# Patient Record
Sex: MALE | NOT HISPANIC OR LATINO | Employment: FULL TIME | URBAN - METROPOLITAN AREA
[De-identification: names, ages, dates, MRNs, and addresses within clinical notes are randomized per-mention and may not be internally consistent; named-entity substitution may affect disease eponyms.]

---

## 2021-02-19 ENCOUNTER — HOSPITAL ENCOUNTER (OUTPATIENT)
Dept: VACCINE CLINIC | Facility: HOSPITAL | Age: 61
Discharge: HOME | End: 2021-02-19
Attending: INTERNAL MEDICINE

## 2021-03-19 ENCOUNTER — HOSPITAL ENCOUNTER (OUTPATIENT)
Dept: VACCINE CLINIC | Facility: HOSPITAL | Age: 61
Discharge: HOME | End: 2021-03-19
Attending: INTERNAL MEDICINE

## 2021-04-16 ENCOUNTER — HOSPITAL ENCOUNTER (OUTPATIENT)
Dept: GASTROENTEROLOGY | Facility: HOSPITAL | Age: 61
Setting detail: HOSPITAL OUTPATIENT SURGERY
Discharge: HOME | End: 2021-04-16
Attending: INTERNAL MEDICINE

## 2022-07-25 ENCOUNTER — APPOINTMENT (OUTPATIENT)
Dept: GENERAL RADIOLOGY | Facility: HOSPITAL | Age: 62
End: 2022-07-25

## 2022-07-25 ENCOUNTER — HOSPITAL ENCOUNTER (EMERGENCY)
Facility: HOSPITAL | Age: 62
Discharge: HOME | End: 2022-07-25
Attending: EMERGENCY MEDICINE | Admitting: EMERGENCY MEDICINE

## 2022-07-25 ENCOUNTER — APPOINTMENT (OUTPATIENT)
Dept: CT IMAGING | Facility: HOSPITAL | Age: 62
End: 2022-07-25

## 2022-07-25 VITALS
HEART RATE: 60 BPM | HEIGHT: 69 IN | DIASTOLIC BLOOD PRESSURE: 96 MMHG | TEMPERATURE: 97.9 F | OXYGEN SATURATION: 95 % | WEIGHT: 252.43 LBS | RESPIRATION RATE: 22 BRPM | SYSTOLIC BLOOD PRESSURE: 154 MMHG | BODY MASS INDEX: 37.39 KG/M2

## 2022-07-25 DIAGNOSIS — R55 VASOVAGAL NEAR-SYNCOPE: ICD-10-CM

## 2022-07-25 DIAGNOSIS — R19.7 NAUSEA, VOMITING, AND DIARRHEA: ICD-10-CM

## 2022-07-25 DIAGNOSIS — N28.1 RENAL CYST, ACQUIRED, LEFT: ICD-10-CM

## 2022-07-25 DIAGNOSIS — K52.9 ACUTE COLITIS: Primary | ICD-10-CM

## 2022-07-25 DIAGNOSIS — R91.1 RIGHT LOWER LOBE PULMONARY NODULE: ICD-10-CM

## 2022-07-25 DIAGNOSIS — A05.9 FOOD POISONING: ICD-10-CM

## 2022-07-25 DIAGNOSIS — R11.2 NAUSEA, VOMITING, AND DIARRHEA: ICD-10-CM

## 2022-07-25 LAB
ALBUMIN SERPL-MCNC: 4.4 G/DL (ref 3.5–5.2)
ALBUMIN/GLOB SERPL: 1.6 G/DL
ALP SERPL-CCNC: 74 U/L (ref 39–117)
ALT SERPL W P-5'-P-CCNC: 13 U/L (ref 1–41)
ANION GAP SERPL CALCULATED.3IONS-SCNC: 12.9 MMOL/L (ref 5–15)
AST SERPL-CCNC: 22 U/L (ref 1–40)
BASOPHILS # BLD AUTO: 0.07 10*3/MM3 (ref 0–0.2)
BASOPHILS NFR BLD AUTO: 0.5 % (ref 0–1.5)
BILIRUB SERPL-MCNC: 0.6 MG/DL (ref 0–1.2)
BUN SERPL-MCNC: 14 MG/DL (ref 8–23)
BUN/CREAT SERPL: 17.1 (ref 7–25)
CALCIUM SPEC-SCNC: 10.1 MG/DL (ref 8.6–10.5)
CHLORIDE SERPL-SCNC: 100 MMOL/L (ref 98–107)
CO2 SERPL-SCNC: 22.1 MMOL/L (ref 22–29)
CREAT SERPL-MCNC: 0.82 MG/DL (ref 0.76–1.27)
DEPRECATED RDW RBC AUTO: 39.5 FL (ref 37–54)
EGFRCR SERPLBLD CKD-EPI 2021: 99.9 ML/MIN/1.73
EOSINOPHIL # BLD AUTO: 0.07 10*3/MM3 (ref 0–0.4)
EOSINOPHIL NFR BLD AUTO: 0.5 % (ref 0.3–6.2)
ERYTHROCYTE [DISTWIDTH] IN BLOOD BY AUTOMATED COUNT: 12.9 % (ref 12.3–15.4)
GLOBULIN UR ELPH-MCNC: 2.8 GM/DL
GLUCOSE SERPL-MCNC: 177 MG/DL (ref 65–99)
HCT VFR BLD AUTO: 46.1 % (ref 37.5–51)
HGB BLD-MCNC: 15.6 G/DL (ref 13–17.7)
HOLD SPECIMEN: NORMAL
HOLD SPECIMEN: NORMAL
IMM GRANULOCYTES # BLD AUTO: 0.11 10*3/MM3 (ref 0–0.05)
IMM GRANULOCYTES NFR BLD AUTO: 0.7 % (ref 0–0.5)
LYMPHOCYTES # BLD AUTO: 1.83 10*3/MM3 (ref 0.7–3.1)
LYMPHOCYTES NFR BLD AUTO: 12 % (ref 19.6–45.3)
MAGNESIUM SERPL-MCNC: 2.1 MG/DL (ref 1.6–2.4)
MCH RBC QN AUTO: 28.4 PG (ref 26.6–33)
MCHC RBC AUTO-ENTMCNC: 33.8 G/DL (ref 31.5–35.7)
MCV RBC AUTO: 83.8 FL (ref 79–97)
MONOCYTES # BLD AUTO: 0.89 10*3/MM3 (ref 0.1–0.9)
MONOCYTES NFR BLD AUTO: 5.8 % (ref 5–12)
NEUTROPHILS NFR BLD AUTO: 12.26 10*3/MM3 (ref 1.7–7)
NEUTROPHILS NFR BLD AUTO: 80.5 % (ref 42.7–76)
NRBC BLD AUTO-RTO: 0 /100 WBC (ref 0–0.2)
PLATELET # BLD AUTO: 293 10*3/MM3 (ref 140–450)
PMV BLD AUTO: 8.9 FL (ref 6–12)
POTASSIUM SERPL-SCNC: 4 MMOL/L (ref 3.5–5.2)
PROT SERPL-MCNC: 7.2 G/DL (ref 6–8.5)
QT INTERVAL: 403 MS
RBC # BLD AUTO: 5.5 10*6/MM3 (ref 4.14–5.8)
SODIUM SERPL-SCNC: 135 MMOL/L (ref 136–145)
TROPONIN T SERPL-MCNC: <0.01 NG/ML (ref 0–0.03)
WBC NRBC COR # BLD: 15.23 10*3/MM3 (ref 3.4–10.8)
WHOLE BLOOD HOLD COAG: NORMAL
WHOLE BLOOD HOLD SPECIMEN: NORMAL

## 2022-07-25 PROCEDURE — 25010000002 ONDANSETRON PER 1 MG: Performed by: EMERGENCY MEDICINE

## 2022-07-25 PROCEDURE — 93005 ELECTROCARDIOGRAM TRACING: CPT | Performed by: EMERGENCY MEDICINE

## 2022-07-25 PROCEDURE — 0 IOPAMIDOL PER 1 ML: Performed by: EMERGENCY MEDICINE

## 2022-07-25 PROCEDURE — 93005 ELECTROCARDIOGRAM TRACING: CPT

## 2022-07-25 PROCEDURE — 96375 TX/PRO/DX INJ NEW DRUG ADDON: CPT

## 2022-07-25 PROCEDURE — 96374 THER/PROPH/DIAG INJ IV PUSH: CPT

## 2022-07-25 PROCEDURE — 71045 X-RAY EXAM CHEST 1 VIEW: CPT

## 2022-07-25 PROCEDURE — 80053 COMPREHEN METABOLIC PANEL: CPT | Performed by: EMERGENCY MEDICINE

## 2022-07-25 PROCEDURE — 74177 CT ABD & PELVIS W/CONTRAST: CPT

## 2022-07-25 PROCEDURE — 84484 ASSAY OF TROPONIN QUANT: CPT | Performed by: EMERGENCY MEDICINE

## 2022-07-25 PROCEDURE — 93010 ELECTROCARDIOGRAM REPORT: CPT | Performed by: INTERNAL MEDICINE

## 2022-07-25 PROCEDURE — 85025 COMPLETE CBC W/AUTO DIFF WBC: CPT | Performed by: EMERGENCY MEDICINE

## 2022-07-25 PROCEDURE — 99283 EMERGENCY DEPT VISIT LOW MDM: CPT

## 2022-07-25 PROCEDURE — 83735 ASSAY OF MAGNESIUM: CPT | Performed by: EMERGENCY MEDICINE

## 2022-07-25 PROCEDURE — 25010000002 KETOROLAC TROMETHAMINE PER 15 MG: Performed by: EMERGENCY MEDICINE

## 2022-07-25 RX ORDER — HYDROCODONE BITARTRATE AND ACETAMINOPHEN 5; 325 MG/1; MG/1
1 TABLET ORAL EVERY 6 HOURS PRN
Qty: 12 TABLET | Refills: 0 | Status: SHIPPED | OUTPATIENT
Start: 2022-07-25

## 2022-07-25 RX ORDER — METRONIDAZOLE 500 MG/1
500 TABLET ORAL 3 TIMES DAILY
Qty: 30 TABLET | Refills: 0 | Status: SHIPPED | OUTPATIENT
Start: 2022-07-25

## 2022-07-25 RX ORDER — ONDANSETRON 2 MG/ML
4 INJECTION INTRAMUSCULAR; INTRAVENOUS ONCE
Status: COMPLETED | OUTPATIENT
Start: 2022-07-25 | End: 2022-07-25

## 2022-07-25 RX ORDER — KETOROLAC TROMETHAMINE 30 MG/ML
30 INJECTION, SOLUTION INTRAMUSCULAR; INTRAVENOUS ONCE
Status: COMPLETED | OUTPATIENT
Start: 2022-07-25 | End: 2022-07-25

## 2022-07-25 RX ORDER — SODIUM CHLORIDE 0.9 % (FLUSH) 0.9 %
10 SYRINGE (ML) INJECTION AS NEEDED
Status: DISCONTINUED | OUTPATIENT
Start: 2022-07-25 | End: 2022-07-25 | Stop reason: HOSPADM

## 2022-07-25 RX ORDER — CIPROFLOXACIN 500 MG/1
500 TABLET, FILM COATED ORAL EVERY 12 HOURS
Qty: 20 TABLET | Refills: 0 | Status: SHIPPED | OUTPATIENT
Start: 2022-07-25

## 2022-07-25 RX ORDER — ONDANSETRON 4 MG/1
4 TABLET, ORALLY DISINTEGRATING ORAL EVERY 6 HOURS PRN
Qty: 12 TABLET | Refills: 0 | Status: SHIPPED | OUTPATIENT
Start: 2022-07-25

## 2022-07-25 RX ADMIN — IOPAMIDOL 100 ML: 755 INJECTION, SOLUTION INTRAVENOUS at 09:51

## 2022-07-25 RX ADMIN — SODIUM CHLORIDE 1000 ML: 9 INJECTION, SOLUTION INTRAVENOUS at 08:06

## 2022-07-25 RX ADMIN — KETOROLAC TROMETHAMINE 30 MG: 30 INJECTION, SOLUTION INTRAMUSCULAR; INTRAVENOUS at 09:06

## 2022-07-25 RX ADMIN — Medication 10 ML: at 08:07

## 2022-07-25 RX ADMIN — ONDANSETRON 4 MG: 2 INJECTION INTRAMUSCULAR; INTRAVENOUS at 09:06

## 2022-07-25 NOTE — ED TRIAGE NOTES
"Pt ambulatory to ED triage with multiple complaints. Pt reports Saturday night had suspected food poisoning because 3-4h after eating had severe abd cramping and nvd at the same time.   Pt reports going to the bathroom at 2100 and while he was vomiting/diarrhea he fell off the commode onto his R side and was unable to use his L arm for 20 min.  Pt reports still having residual weakness to L arm and profound dizziness \"like i'm going to blackout\" and walking diagonally. Pt reports now has \"splattering of blood\" when he has a bm and feeling sob.  Pt presents a/ox4, gcs 15, reports waited to come to ED for eval \"because I wanted to wait until you were fully staffed\"  Pt drove self here.  "

## 2022-07-25 NOTE — ED PROVIDER NOTES
"Time: 8:03 AM EDT  Arrived by: private car  Chief Complaint: Dizziness, left arm numbness  History provided by: pt  History is limited by: N/A     History of Present Illness:  Patient is a 62 y.o. year old male who presents to the emergency department with  A chief complaint of dizziness, nausea, vomiting. Pt ambulatory to ED triage with multiple complaints. Pt reports Saturday night had suspected food poisoning because 3-4h after eating had severe abd cramping, 5/10 pain, constant, and generalized, and nvd at the same time. Pt reports going to the bathroom at 2100 and while he was vomiting/diarrhea he fell off the commode onto his R side and was unable to use his L arm for 20 min. He could move his right arm but it was really weak. Pt reports still having residual weakness to L arm and profound dizziness \"like i'm going to blackout\" and walking diagonally. Pt reports now has \"splattering of blood\" when he has a bm and feeling sob.Pt presents a/ox4, gcs 15, reports waited to come to ED for eval \"because I wanted to wait until you were fully staffed\". Pt drove self here. Pt denies dysuria and hematuria. Pt denies being on a blood thinner. Pt states the food he had ate was [employer] chicken.                   Similar Symptoms Previously: No  Recently seen: No      Patient Care Team  Primary Care Provider: Provider, No Known    Past Medical History:     No Known Allergies  Past Medical History:   Diagnosis Date   • Allergies    • Hypertension    • Meniere disease      Past Surgical History:   Procedure Laterality Date   • EXTERNAL EAR SURGERY       History reviewed. No pertinent family history.    Home Medications:  Prior to Admission medications    Medication Sig Start Date End Date Taking? Authorizing Provider   azithromycin (Zithromax Z-Earle) 250 MG tablet Take 2 tablets by mouth on day 1, then 1 tablet daily on days 2-5 12/14/21   Kristy Umana APRN   brompheniramine-pseudoephedrine-DM 30-2-10 MG/5ML " "syrup Take 10 mL by mouth 4 (Four) Times a Day As Needed for Congestion or Cough. 12/14/21   Kristy Umana APRN   fexofenadine (ALLEGRA) 180 MG tablet Take 180 mg by mouth Daily.    Emergency, Nurse Aimee, RN        Social History:   Social History     Tobacco Use   • Smoking status: Never Smoker   • Smokeless tobacco: Never Used   Substance Use Topics   • Alcohol use: Not Currently   • Drug use: Never     Recent travel: no     Review of Systems:  Review of Systems   Constitutional: Negative for chills and fever.   HENT: Negative for congestion, ear pain and sore throat.    Eyes: Negative for pain.   Respiratory: Positive for shortness of breath. Negative for cough and chest tightness.    Cardiovascular: Negative for chest pain.   Gastrointestinal: Positive for abdominal pain, nausea and vomiting. Negative for diarrhea.   Genitourinary: Negative for flank pain and hematuria.        Pt reports blood in bowl movements now   Musculoskeletal: Negative for joint swelling.   Skin: Negative for pallor.   Neurological: Positive for dizziness. Negative for seizures and headaches.        Pt reports passing out, left arm and right arm weakness   All other systems reviewed and are negative.       Physical Exam:  /96   Pulse 60   Temp 97.9 °F (36.6 °C) (Oral)   Resp 22   Ht 175.3 cm (69\")   Wt 115 kg (252 lb 6.8 oz)   SpO2 95%   BMI 37.28 kg/m²     Physical Exam  Vitals and nursing note reviewed.   Constitutional:       General: He is not in acute distress.     Appearance: Normal appearance. He is not toxic-appearing.   HENT:      Head: Normocephalic and atraumatic.      Mouth/Throat:      Mouth: Mucous membranes are moist.   Eyes:      General: No scleral icterus.  Cardiovascular:      Rate and Rhythm: Normal rate and regular rhythm.      Pulses: Normal pulses.      Heart sounds: Normal heart sounds.   Pulmonary:      Effort: Pulmonary effort is normal. No respiratory distress.      Breath sounds: Normal " breath sounds.   Abdominal:      General: Abdomen is flat.      Palpations: Abdomen is soft.      Tenderness: There is abdominal tenderness (mild) in the suprapubic area.   Musculoskeletal:         General: Normal range of motion.      Cervical back: Normal range of motion and neck supple.   Skin:     General: Skin is warm and dry.   Neurological:      Mental Status: He is alert and oriented to person, place, and time. Mental status is at baseline.      Cranial Nerves: No dysarthria or facial asymmetry.      Sensory: Sensation is intact.      Motor: No weakness or pronator drift.                Medications in the Emergency Department:  Medications   sodium chloride 0.9 % bolus 1,000 mL (0 mL Intravenous Stopped 7/25/22 0906)   ondansetron (ZOFRAN) injection 4 mg (4 mg Intravenous Given 7/25/22 0906)   ketorolac (TORADOL) injection 30 mg (30 mg Intravenous Given 7/25/22 0906)   iopamidol (ISOVUE-370) 76 % injection 100 mL (100 mL Intravenous Given 7/25/22 0951)        Labs  Lab Results (last 24 hours)     Procedure Component Value Units Date/Time    CBC & Differential [[id number]]  (Abnormal) Collected: 07/25/22 0749    Specimen: Blood Updated: 07/25/22 0802    Narrative:      The following orders were created for panel order CBC & Differential.  Procedure                               Abnormality         Status                     ---------                               -----------         ------                     CBC Auto Differential[[id number]]        Abnormal            Final result                 Please view results for these tests on the individual orders.    Comprehensive Metabolic Panel [[id number]]  (Abnormal) Collected: 07/25/22 0749    Specimen: Blood Updated: 07/25/22 0823     Glucose 177 mg/dL      BUN 14 mg/dL      Creatinine 0.82 mg/dL      Sodium 135 mmol/L      Potassium 4.0 mmol/L      Comment: Slight hemolysis detected by analyzer. Results may be affected.        Chloride 100 mmol/L      CO2 22.1  mmol/L      Calcium 10.1 mg/dL      Total Protein 7.2 g/dL      Albumin 4.40 g/dL      ALT (SGPT) 13 U/L      AST (SGOT) 22 U/L      Alkaline Phosphatase 74 U/L      Total Bilirubin 0.6 mg/dL      Globulin 2.8 gm/dL      A/G Ratio 1.6 g/dL      BUN/Creatinine Ratio 17.1     Anion Gap 12.9 mmol/L      eGFR 99.9 mL/min/1.73      Comment: National Kidney Foundation and American Society of Nephrology (ASN) Task Force recommended calculation based on the Chronic Kidney Disease Epidemiology Collaboration (CKD-EPI) equation refit without adjustment for race.       Narrative:      GFR Normal >60  Chronic Kidney Disease <60  Kidney Failure <15      Troponin [[id number]]  (Normal) Collected: 07/25/22 0749    Specimen: Blood Updated: 07/25/22 0823     Troponin T <0.010 ng/mL     Narrative:      Troponin T Reference Range:  <= 0.03 ng/mL-   Negative for AMI  >0.03 ng/mL-     Abnormal for myocardial necrosis.  Clinicians would have to utilize clinical acumen, EKG, Troponin and serial changes to determine if it is an Acute Myocardial Infarction or myocardial injury due to an underlying chronic condition.       Results may be falsely decreased if patient taking Biotin.      Magnesium [[id number]]  (Normal) Collected: 07/25/22 0749    Specimen: Blood Updated: 07/25/22 0823     Magnesium 2.1 mg/dL     CBC Auto Differential [[id number]]  (Abnormal) Collected: 07/25/22 0749    Specimen: Blood Updated: 07/25/22 0802     WBC 15.23 10*3/mm3      RBC 5.50 10*6/mm3      Hemoglobin 15.6 g/dL      Hematocrit 46.1 %      MCV 83.8 fL      MCH 28.4 pg      MCHC 33.8 g/dL      RDW 12.9 %      RDW-SD 39.5 fl      MPV 8.9 fL      Platelets 293 10*3/mm3      Neutrophil % 80.5 %      Lymphocyte % 12.0 %      Monocyte % 5.8 %      Eosinophil % 0.5 %      Basophil % 0.5 %      Immature Grans % 0.7 %      Neutrophils, Absolute 12.26 10*3/mm3      Lymphocytes, Absolute 1.83 10*3/mm3      Monocytes, Absolute 0.89 10*3/mm3      Eosinophils, Absolute 0.07  10*3/mm3      Basophils, Absolute 0.07 10*3/mm3      Immature Grans, Absolute 0.11 10*3/mm3      nRBC 0.0 /100 WBC     Clostridium Difficile Toxin, PCR - Stool, Per Rectum [[id number]] Updated: 07/25/22 1324    Specimen: Stool from Per Rectum            Imaging:  CT Abdomen Pelvis With Contrast    Result Date: 7/25/2022  PROCEDURE: CT ABDOMEN PELVIS W CONTRAST  COMPARISON: None  INDICATIONS: GENERALIZED LOWER ABDOMEN PAIN WITH BLOODY DIARRHEA  TECHNIQUE: After obtaining the patient's consent, CT images were created with non-ionic intravenous contrast material.   PROTOCOL:   Standard imaging protocol performed    RADIATION:   DLP: 1600mGy*cm   Automated exposure control was utilized to minimize radiation dose. CONTRAST: 100cc Isovue 370 I.V.  FINDINGS:  There are two 4 mm noncalcified fissural nodules in the right base adjacent to the major fissure and minor fissure.  There is linear consolidation in the bases suggesting atelectasis.  The coronary artery calcification is present.  The spleen, pancreas adrenals, liver and gallbladder have a grossly normal appearance.  There is a 1.9 cm mixed cystic and solid lesion with enhancing mural nodularity in the upper pole of the left kidney.  This is suspicious for renal cell carcinoma.  This may be better characterized by MRI.  The kidneys are otherwise grossly unremarkable.  There is no evidence of hydronephrosis.  Urinary bladder is grossly unremarkable.  Prostate demonstrates central calcification.  The stomach and small bowel have a grossly normal caliber and appearance.  The appendix is unremarkable.  Colonic fecal retention is noted.  There is circumferential wall thickening involving the descending colon with pericolonic inflammatory stranding.  This extends from the splenic flexure to the left lower quadrant.  This may be due to inflammatory bowel disease, ischemic or infectious colitis.  Correlate clinically.  There are no colonic diverticula within the area to  suggest diverticulitis.  There is grossly normal opacification of mesenteric vessels.  Scattered atherosclerotic vascular calcification is noted.  No gross adenopathy is identified in the abdomen or pelvis.  There is a small fat containing left inguinal hernia.  There is bony ankylosis of the right SI joint.  No aggressive osseous lesions are identified.         1. There is long segment circumferential wall thickening involving the descending colon and associated pericolonic inflammatory change indicating nonspecific colitis.  This may be due to inflammatory bowel disease, ischemic or infectious colitis.  2. There is a 1.9 cm mixed solid and cystic partially enhancing lesion arising from the upper pole of the left kidney.  This is suspicious for renal cell carcinoma.  This may be more definitively characterized by MRI.  3. 4 mm noncalcified right basilar fissural nodules.  Follow-up chest CT recommended based on patient risk factors for lung cancer per Fleischner guidelines.      LYNDSEY BRAGG MD       Electronically Signed and Approved By: LYNDSEY BRAGG MD on 7/25/2022 at 10:17             XR Chest 1 View    Result Date: 7/25/2022  PROCEDURE: XR CHEST 1 VW  COMPARISON: None  INDICATIONS: Weak/Dizzy/AMS triage protocol  FINDINGS:  There is linear density in the left base suggesting minor atelectasis or scarring.  There are low lung volumes.  There is a 5 mm nodular density projecting over the ribs at the right base.  This may be due to calcified granuloma.  There are no prior studies available for comparison, however.  No other focal pulmonary opacities are identified.  No pleural fluid is seen.  No pneumothorax is evident.  Heart size and mediastinal contour are within normal limits.         1. Left basilar scarring or atelectasis.  2. 5 mm indeterminate nodular density projecting over the right base may be due to calcified granuloma.  No prior studies available for comparison.  This could be more definitively  characterized by CT in nonemergent fashion.        LYNDSEY BRAGG MD       Electronically Signed and Approved By: LYNDSEY BRAGG MD on 7/25/2022 at 7:39               Procedures:  ECG 12 Lead      Date/Time: 7/25/2022 8:24 AM  Performed by: Martínez Lowery MD  Authorized by: Martínez Lowery MD   Rhythm: sinus rhythm  BPM: 68  QRS axis: normal  ST Segments: ST segments normal  T Waves: T waves normal  Other findings comments: Normal P waves  Clinical impression comment: No acute ischemia            Progress                            Medical Decision Making:  MDM  Number of Diagnoses or Management Options     Amount and/or Complexity of Data Reviewed  Clinical lab tests: reviewed  Tests in the radiology section of CPT®: reviewed  Tests in the medicine section of CPT®: reviewed    Patient Progress  Patient progress: (12:07 EDT Updated pt on progress and incidental findings, specifically pulmonary nodules and left kidney, complex cyst. Recommended follow up MRI and follow up appointment. Pt understood and agreed to plan)       My differential diagnosis for this patient's syncope includes orthostatic hypotension, dehydration, acute blood loss anemia, cardiac arrhythmia, vasovagal syncope.    In my differential diagnosis of this patient with abdominal pain, I considered viral gastroenteritis, acute gastritis, GERD exacerbation with esophagitis, peptic ulcer disease, pancreatitis, cholecystitis, appendicitis.        Patient is a 62-year-old male who presents with sudden onset of nausea and vomiting and diarrhea and abdominal cramping after eating the other day.    It sounds like an episode of food poisoning versus less likely viral gastroenteritis.    Given his tenderness and the fact that he did have some bloody diarrhea yesterday, I did get a CT scan which does show some descending colitis.    I am starting him on Cipro and Flagyl antibiotics and clear liquid diet and some pain and nausea meds.    He was on some empiric  antibiotics following skin biopsy on his back a couple weeks ago and I considered possibility of C. difficile and we tried to send a stool sample but it was actually totally formed stool and they declined it here.    I will have him follow-up with his doctor and return for any worsening symptoms.      Final diagnoses:   Vasovagal near-syncope   Food poisoning   Nausea, vomiting, and diarrhea   Right lower lobe pulmonary nodule   Renal cyst, acquired, left   Acute colitis        Disposition:  ED Disposition     ED Disposition   Discharge    Condition   Stable    Comment   --             This medical record created using voice recognition software.  Documentation assistance provided by Gabrielle Riggins acting as scribe for No att. providers found. Information recorded by the scribe was done at my direction and has been verified and validated by me.            Jacky Riggins  07/25/22 1180       Jacky Riggins  07/25/22 121       Martínez Lowery MD  07/25/22 4551

## 2024-01-08 ENCOUNTER — TELEPHONE (OUTPATIENT)
Dept: GASTROENTEROLOGY | Facility: CLINIC | Age: 64
End: 2024-01-08

## 2024-01-08 NOTE — TELEPHONE ENCOUNTER
Hub staff attempted to follow warm transfer process and was unsuccessful     Caller: Jean Joe    Relationship to patient: Self    Best call back number: [phone]  CAN CALL ANY TIME BETWEEN 12-5PM.    Patient is needing: PATIENT REQUESTING A CALL BACK TO MAKE SURE THAT THE OFFICE HAS RECE[facility]EDU[facility] THAT IS ON A 3 YEAR RECALL IN 2024, PATIENT WOULD LIKE CONFIRMATION THAT THE OFFICE HAS EVERYTHING THEY NEED.

## 2024-03-05 ENCOUNTER — LAB (OUTPATIENT)
Dept: LAB | Facility: HOSPITAL | Age: 64
End: 2024-03-05
Payer: OTHER GOVERNMENT

## 2024-03-05 ENCOUNTER — OFFICE VISIT (OUTPATIENT)
Dept: GASTROENTEROLOGY | Facility: CLINIC | Age: 64
End: 2024-03-05
Payer: OTHER GOVERNMENT

## 2024-03-05 VITALS
WEIGHT: 236 LBS | DIASTOLIC BLOOD PRESSURE: 78 MMHG | BODY MASS INDEX: 34.96 KG/M2 | HEART RATE: 91 BPM | SYSTOLIC BLOOD PRESSURE: 135 MMHG | HEIGHT: 69 IN

## 2024-03-05 DIAGNOSIS — R10.13 EPIGASTRIC PAIN: Primary | ICD-10-CM

## 2024-03-05 DIAGNOSIS — Z86.010 HISTORY OF COLON POLYPS: ICD-10-CM

## 2024-03-05 DIAGNOSIS — R10.30 LOWER ABDOMINAL PAIN: ICD-10-CM

## 2024-03-05 DIAGNOSIS — R19.8 IRREGULAR BOWEL HABITS: ICD-10-CM

## 2024-03-05 DIAGNOSIS — R10.13 EPIGASTRIC PAIN: ICD-10-CM

## 2024-03-05 PROBLEM — J42 CHRONIC BRONCHITIS: Status: ACTIVE | Noted: 2024-03-05

## 2024-03-05 PROBLEM — H81.09 MENIERE'S DISEASE: Status: ACTIVE | Noted: 2024-03-05

## 2024-03-05 PROBLEM — K63.5 COLON POLYPS: Status: ACTIVE | Noted: 2024-03-05

## 2024-03-05 PROBLEM — C37: Status: ACTIVE | Noted: 2024-03-05

## 2024-03-05 PROCEDURE — 86364 TISS TRNSGLTMNASE EA IG CLAS: CPT

## 2024-03-05 PROCEDURE — 86003 ALLG SPEC IGE CRUDE XTRC EA: CPT

## 2024-03-05 PROCEDURE — 82785 ASSAY OF IGE: CPT

## 2024-03-05 PROCEDURE — 36415 COLL VENOUS BLD VENIPUNCTURE: CPT

## 2024-03-05 PROCEDURE — 86008 ALLG SPEC IGE RECOMB EA: CPT

## 2024-03-05 PROCEDURE — 86258 DGP ANTIBODY EACH IG CLASS: CPT

## 2024-03-05 PROCEDURE — 82784 ASSAY IGA/IGD/IGG/IGM EACH: CPT

## 2024-03-05 RX ORDER — LOSARTAN POTASSIUM 50 MG/1
TABLET ORAL
COMMUNITY
Start: 2024-01-14

## 2024-03-05 RX ORDER — SODIUM, POTASSIUM,MAG SULFATES 17.5-3.13G
2 SOLUTION, RECONSTITUTED, ORAL ORAL TAKE AS DIRECTED
Qty: 177 ML | Refills: 0 | Status: SHIPPED | OUTPATIENT
Start: 2024-03-05

## 2024-03-05 RX ORDER — DICYCLOMINE HCL 20 MG
20 TABLET ORAL EVERY 6 HOURS PRN
Qty: 120 TABLET | Refills: 1 | Status: SHIPPED | OUTPATIENT
Start: 2024-03-05

## 2024-03-05 NOTE — PROGRESS NOTES
Chief Complaint     Diarrhea, Constipation, and Abdominal Pain    History of Present Illness     Jean Joe is a 63 y.o. male who presents to [facility]  for a gastroenterology evaluation of altered bowel pattern and abdominal pain.      He reports epigastric pain that's present about 1-2 times per week.  He will often take a TUMS or gas-x with relief.  This has been present for about one year.  Denies dysphagia.      Reports an irregular bowel pattern.  Will often have a bowel movement that's like #3, but then 10-15 minutes later will have a bowel movement that's more like #6.  Admits straining with bowel movements 2-3 times per week.  Denies rectal bleeding.      He eats at a restaurant about once per week.      After eating cracker barrel at about 3 pm ([employer]) reports that he woke up with the feeling of abdomen being really tight.  Experienced profuse sweating and dizziness.  This has occurred on a few different occasions after eating at a restaurant.      He has chronic hives and is followed by [facility].  Takes allegra daily and avoids bothersome foods.  States that they couldn't find what he was allergic to.  He is unable to drink milk or eat cheese, tomatoes or ketchup.         History      Past Medical History:   Diagnosis Date    Allergies     Hypertension     Meniere disease        Past Surgical History:   Procedure Laterality Date    COLONOSCOPY      EXTERNAL EAR SURGERY         Family History   Problem Relation Age of Onset    Colon cancer Neg Hx         Current Medications        Current Outpatient Medications:     fexofenadine (ALLEGRA) 180 MG tablet, Take 1 tablet by mouth Daily., Disp: , Rfl:     losartan (COZAAR) 50 MG tablet, , Disp: , Rfl:     ondansetron ODT (ZOFRAN-ODT) 4 MG disintegrating tablet, Place 1 tablet on the tongue Every 6 (Six) Hours As Needed for Nausea or Vomiting., Disp: 12 tablet, Rfl: 0    dicyclomine (BENTYL) 20  "MG tablet, Take 1 tablet by mouth Every 6 (Six) Hours As Needed (abdominal pain and diarrhea)., Disp: 120 tablet, Rfl: 1    sodium-potassium-magnesium sulfates (Suprep Bowel Prep Kit) 17.5-3.13-1.6 GM/177ML solution oral solution, Take 2 bottles by mouth Take As Directed., Disp: 177 mL, Rfl: 0     Allergies     No Known Allergies    Social History       Social History     Social History Narrative    Not on file       Immunizations     Immunization:  Immunization History   Administered Date(s) Administered    COVID-19 (MODERNA) 1st,2nd,3rd Dose Monovalent 02/19/2021, 03/19/2021    COVID-19 (MODERNA) Monovalent Original Booster 11/17/2021          Objective     Objective     Vital Signs:   /78 (BP Location: Left arm, Patient Position: Sitting, Cuff Size: Large Adult)   Pulse 91   Ht 175.3 cm (69\")   Wt 107 kg (236 lb)   BMI 34.85 kg/m²       Physical Exam    Results      Result Review :                  Assessment and Plan        Assessment and Plan    Diagnoses and all orders for this visit:    1. Epigastric pain (Primary)  -     Celiac Panel Reflex To Titer; Future  -     Food Allergy Profile; Future  -     Alpha-Gal IgE Panel; Future  -     Case Request; Standing  -     Case Request    2. Lower abdominal pain  -     dicyclomine (BENTYL) 20 MG tablet; Take 1 tablet by mouth Every 6 (Six) Hours As Needed (abdominal pain and diarrhea).  Dispense: 120 tablet; Refill: 1  -     Case Request; Standing  -     Case Request    3. Irregular bowel habits  -     Case Request; Standing  -     Case Request    4. History of colon polyps  -     Case Request; Standing  -     Case Request    Other orders  -     Follow Anesthesia Guidelines / Protocol; Future  -     Verify NPO; Standing  -     Verify Bowel Prep Was Successful; Standing  -     Give Tap Water Enema If Bowel Prep Insufficient; Standing  -     Obtain Informed Consent; Standing  -     sodium-potassium-magnesium sulfates (Suprep Bowel Prep Kit) 17.5-3.13-1.6 " GM/177ML solution oral solution; Take 2 bottles by mouth Take As Directed.  Dispense: 177 mL; Refill: 0        ESOPHAGOGASTRODUODENOSCOPY (N/A), COLONOSCOPY FOR SCREENING (N/A)  The risk of the endoscopy were discussed in detail. Possible risks/complications, benefits, and alternatives to surgical or invasive procedure have been explained to patient and/or legal guardian; risks include bleeding, infection, and perforation. Patient has been evaluated and can tolerate anesthesia and/or sedation.       Follow Up        Follow Up   Return in about 6 months (around 9/5/2024) for abdominal pain.  Patient was given instructions and counseling regarding his condition or for health maintenance advice. Please see specific information pulled into the AVS if appropriate.

## 2024-03-06 LAB
GLIADIN PEPTIDE IGA SER-ACNC: 3 UNITS (ref 0–19)
IGA SERPL-MCNC: 37 MG/DL (ref 61–437)
TTG IGA SER-ACNC: <2 U/ML (ref 0–3)

## 2024-03-08 ENCOUNTER — TELEPHONE (OUTPATIENT)
Dept: GASTROENTEROLOGY | Facility: CLINIC | Age: 64
End: 2024-03-08
Payer: OTHER GOVERNMENT

## 2024-03-08 DIAGNOSIS — D80.2 IGA DEFICIENCY: Primary | ICD-10-CM

## 2024-03-08 NOTE — TELEPHONE ENCOUNTER
----- Message from GITA White sent at 3/8/2024  1:08 PM EST -----  Celiac serology negative, however patient with low IgA so this is not always accurate.  Recommend referral to allergist for further eval of IgA deficiency.  Order placed.

## 2024-03-08 NOTE — TELEPHONE ENCOUNTER
Patient notified, he says he is a patient at [facility] in [facility], would you like me to call and schedule him an appointment? Or send his results?

## 2024-03-09 LAB
CLAM IGE QN: <0.1 KU/L
CODFISH IGE QN: <0.1 KU/L
CONV CLASS DESCRIPTION: NORMAL
CORN IGE QN: <0.1 KU/L
COW MILK IGE QN: <0.1 KU/L
EGG WHITE IGE QN: <0.1 KU/L
PEANUT IGE QN: <0.1 KU/L
SCALLOP IGE QN: <0.1 KU/L
SESAME SEED IGE QN: <0.1 KU/L
SHRIMP IGE QN: <0.1 KU/L
SOYBEAN IGE QN: <0.1 KU/L
WALNUT IGE QN: <0.1 KU/L
WHEAT IGE QN: <0.1 KU/L

## 2024-03-11 LAB
ALPHA-GAL IGE QN: <0.1 KU/L
BEEF IGE QN: <0.1 KU/L
CONV CLASS DESCRIPTION: NORMAL
IGE SERPL-ACNC: 15 IU/ML (ref 6–495)
LAMB IGE QN: <0.1 KU/L
PORK IGE QN: <0.1 KU/L

## 2024-03-11 NOTE — TELEPHONE ENCOUNTER
Spoke with Sonali from [facility], she said that the patient has a follow up scheduled for 5/23/24, is it ok for patient to wait until then?

## 2024-03-12 ENCOUNTER — TELEPHONE (OUTPATIENT)
Dept: GASTROENTEROLOGY | Facility: CLINIC | Age: 64
End: 2024-03-12
Payer: OTHER GOVERNMENT

## 2024-03-12 NOTE — TELEPHONE ENCOUNTER
----- Message from GITA White sent at 3/11/2024  7:06 PM EDT -----  Alpha gal and food allergy profile negative.

## 2024-03-15 ENCOUNTER — PATIENT ROUNDING (BHMG ONLY) (OUTPATIENT)
Dept: GASTROENTEROLOGY | Facility: CLINIC | Age: 64
End: 2024-03-15
Payer: OTHER GOVERNMENT

## 2024-03-15 NOTE — PROGRESS NOTES
"3/15/2024      Hello, may I speak with Jean Joe     My name is Evonne. I am calling from [facility]. I show that you had a recent visit with GITA Alvarez.    Before we get started may I verify your date of birth? 1960    I am calling to officially welcome you to our practice and ask about your recent visit. Is this a good time to talk? Yes     Tell me about your visit with us. What things went well? \"Everything went well, I feel like she asked good questions & she answered all of my questions\"    We strive to ensure that we protect your safety and privacy. Is there anything we could have done to improve this during your visit?    No    We're always looking for ways to make our patients' experiences even better. Do you have recommendations on ways we may improve?  No     Overall were you satisfied with your first visit to our practice?  Yes     I appreciate you taking the time to speak with me today. Is there anything else I can do for you?  No     I am glad to hear that you had a very good visit and I appreciate you taking the time to provide feedback on this call. We would greatly appreciate you filling out a survey if you receive one in the mail, email or text. This is a great opportunity to provide any additional feedback that you may think of after this call as well.       Thank you, and have a great day.   "

## 2024-04-03 NOTE — PRE-PROCEDURE INSTRUCTIONS
"Instructed on date and arrival time of 0630. Come to entrance \"C\". Must have  over age 18 to drive home.  May have two visitors; however, children under 12 must stay in waiting room.  Discussed clear liquid diet (no red or purple), bowel prep, and NPO.  May take medications as usual except for blood thinners, diabetic medications, and weight loss medications.  Verbalized understanding of instructions given.  Instructed to call for questions or concerns.  "

## 2024-04-10 ENCOUNTER — ANESTHESIA EVENT (OUTPATIENT)
Dept: GASTROENTEROLOGY | Facility: HOSPITAL | Age: 64
End: 2024-04-10
Payer: OTHER GOVERNMENT

## 2024-04-10 NOTE — ANESTHESIA PREPROCEDURE EVALUATION
Anesthesia Evaluation     Patient summary reviewed and Nursing notes reviewed   NPO Solid Status: > 8 hours  NPO Liquid Status: > 6 hours           Airway   Mallampati: II  TM distance: >3 FB  Neck ROM: full  No difficulty expected  Dental - normal exam     Pulmonary     breath sounds clear to auscultation  Cardiovascular   Exercise tolerance: good (4-7 METS) (Walks with a cane )    ECG reviewed  Rhythm: regular  Rate: normal    (+) hypertension well controlled less than 2 medications      Neuro/Psych  (+) dizziness/light headedness    ROS Comment: Chronic disequilibrium ( hx of Meniere's)  GI/Hepatic/Renal/Endo    (+) obesity    Musculoskeletal     Abdominal    Substance History      OB/GYN          Other      history of cancer    ROS/Med Hx Other: EKG 07/25/22: HR 68, ST segments normal     Patient [place] ( hearing aids at home)      Phys Exam Other: [place]                Anesthesia Plan    ASA 2     general   total IV anesthesia  (Total IV Anesthesia    Patient understands anesthesia not responsible for dental damage.  )  intravenous induction     Anesthetic plan, risks, benefits, and alternatives have been provided, discussed and informed consent has been obtained with: patient.  Pre-procedure education provided  Plan discussed with CRNA.    CODE STATUS:

## 2024-04-11 ENCOUNTER — ANESTHESIA (OUTPATIENT)
Dept: GASTROENTEROLOGY | Facility: HOSPITAL | Age: 64
End: 2024-04-11
Payer: OTHER GOVERNMENT

## 2024-04-11 ENCOUNTER — HOSPITAL ENCOUNTER (OUTPATIENT)
Facility: HOSPITAL | Age: 64
Setting detail: HOSPITAL OUTPATIENT SURGERY
Discharge: HOME | End: 2024-04-11
Attending: INTERNAL MEDICINE | Admitting: INTERNAL MEDICINE
Payer: OTHER GOVERNMENT

## 2024-04-11 VITALS
DIASTOLIC BLOOD PRESSURE: 92 MMHG | BODY MASS INDEX: 32.98 KG/M2 | HEIGHT: 69 IN | OXYGEN SATURATION: 97 % | WEIGHT: 222.66 LBS | TEMPERATURE: 97.4 F | SYSTOLIC BLOOD PRESSURE: 122 MMHG | HEART RATE: 67 BPM | RESPIRATION RATE: 19 BRPM

## 2024-04-11 DIAGNOSIS — R10.13 EPIGASTRIC PAIN: ICD-10-CM

## 2024-04-11 DIAGNOSIS — Z86.010 HISTORY OF COLON POLYPS: ICD-10-CM

## 2024-04-11 DIAGNOSIS — R10.30 LOWER ABDOMINAL PAIN: ICD-10-CM

## 2024-04-11 DIAGNOSIS — R19.8 IRREGULAR BOWEL HABITS: ICD-10-CM

## 2024-04-11 PROCEDURE — 88305 TISSUE EXAM BY PATHOLOGIST: CPT | Performed by: INTERNAL MEDICINE

## 2024-04-11 PROCEDURE — 25810000003 LACTATED RINGERS PER 1000 ML: Performed by: NURSE ANESTHETIST, CERTIFIED REGISTERED

## 2024-04-11 PROCEDURE — 25010000002 PROPOFOL 10 MG/ML EMULSION: Performed by: NURSE ANESTHETIST, CERTIFIED REGISTERED

## 2024-04-11 PROCEDURE — 45380 COLONOSCOPY AND BIOPSY: CPT | Performed by: INTERNAL MEDICINE

## 2024-04-11 PROCEDURE — 43239 EGD BIOPSY SINGLE/MULTIPLE: CPT | Performed by: INTERNAL MEDICINE

## 2024-04-11 RX ORDER — PROPOFOL 10 MG/ML
VIAL (ML) INTRAVENOUS AS NEEDED
Status: DISCONTINUED | OUTPATIENT
Start: 2024-04-11 | End: 2024-04-11 | Stop reason: SURG

## 2024-04-11 RX ORDER — SODIUM CHLORIDE, SODIUM LACTATE, POTASSIUM CHLORIDE, CALCIUM CHLORIDE 600; 310; 30; 20 MG/100ML; MG/100ML; MG/100ML; MG/100ML
30 INJECTION, SOLUTION INTRAVENOUS CONTINUOUS
Status: DISCONTINUED | OUTPATIENT
Start: 2024-04-11 | End: 2024-04-11 | Stop reason: HOSPADM

## 2024-04-11 RX ORDER — LIDOCAINE HYDROCHLORIDE 20 MG/ML
INJECTION, SOLUTION EPIDURAL; INFILTRATION; INTRACAUDAL; PERINEURAL AS NEEDED
Status: DISCONTINUED | OUTPATIENT
Start: 2024-04-11 | End: 2024-04-11 | Stop reason: SURG

## 2024-04-11 RX ORDER — PANTOPRAZOLE SODIUM 20 MG/1
20 TABLET, DELAYED RELEASE ORAL DAILY
Qty: 30 TABLET | Refills: 1 | Status: SHIPPED | OUTPATIENT
Start: 2024-04-11

## 2024-04-11 RX ADMIN — PROPOFOL 250 MCG/KG/MIN: 10 INJECTION, EMULSION INTRAVENOUS at 07:59

## 2024-04-11 RX ADMIN — PROPOFOL 100 MG: 10 INJECTION, EMULSION INTRAVENOUS at 07:59

## 2024-04-11 RX ADMIN — LIDOCAINE HYDROCHLORIDE 100 MG: 20 INJECTION, SOLUTION EPIDURAL; INFILTRATION; INTRACAUDAL; PERINEURAL at 07:59

## 2024-04-11 RX ADMIN — SODIUM CHLORIDE, POTASSIUM CHLORIDE, SODIUM LACTATE AND CALCIUM CHLORIDE 30 ML/HR: 600; 310; 30; 20 INJECTION, SOLUTION INTRAVENOUS at 06:50

## 2024-04-11 NOTE — NURSING NOTE
ABDOMINAL PRESSURE APPLIED PER MD INSTRUCTION TO ASSIST WITH ADVANCEMENT OF SCOPE. ABDOMINAL PRESSURE HELD FOR APPROXIMATELY 1 MINUTE.

## 2024-04-11 NOTE — ANESTHESIA POSTPROCEDURE EVALUATION
Patient: Jean Joe    Procedure Summary       Date: 04/11/24 Room / Location: [facility] END[facility]COPY 4 / [facility]    Anesthesia Start: 0757 Anesthesia Stop: 0825    Procedures:       ESOPHAGOGASTRODUODENOSCOPY WITH BIOPSIES      COLONOSCOPY WITH BIOPSIES Diagnosis:       Epigastric pain      Lower abdominal pain      Irregular bowel habits      History of colon polyps      (Epigastric pain [R10.13])      (Lower abdominal pain [R10.30])      (Irregular bowel habits [R19.8])      (History of colon polyps [Z86.010])    Surgeons: Karen Arizmendi MD Provider: Manuel Shaffer CRNA    Anesthesia Type: general ASA Status: 2            Anesthesia Type: general    Vitals  Vitals Value Taken Time   /92 04/11/24 0848   Temp 36.3 °C (97.4 °F) 04/11/24 0828   Pulse 62 04/11/24 0849   Resp 19 04/11/24 0847   SpO2 98 % 04/11/24 0849   Vitals shown include unfiled device data.        Post Anesthesia Care and Evaluation    Patient location during evaluation: bedside  Patient participation: complete - patient participated  Level of consciousness: awake  Pain management: adequate    Airway patency: patent  Anesthetic complications: No anesthetic complications  PONV Status: controlled  Cardiovascular status: acceptable and stable  Respiratory status: acceptable

## 2024-04-11 NOTE — H&P
"Pre Procedure History & Physical    Chief Complaint:   Epigastric pain, lower abd pain, irregular bowel habits    Subjective     HPI:   64 yo M here for eval of epigastric pain, lower abd pain, irregular bowel habits.    Past Medical History:   Past Medical History:   Diagnosis Date    Allergies     Hypertension     Meniere disease     Thymus cancer        Past Surgical History:  Past Surgical History:   Procedure Laterality Date    COLONOSCOPY      EXTERNAL EAR SURGERY         Family History:  Family History   Problem Relation Age of Onset    Colon cancer Neg Hx        Social History:   reports that he has never smoked. He has never used smokeless tobacco. He reports that he does not currently use alcohol. He reports that he does not use drugs.    Medications:   Medications Prior to Admission   Medication Sig Dispense Refill Last Dose    fexofenadine (ALLEGRA) 180 MG tablet Take 1 tablet by mouth Daily.   4/10/2024    losartan (COZAAR) 50 MG tablet    4/11/2024    dicyclomine (BENTYL) 20 MG tablet Take 1 tablet by mouth Every 6 (Six) Hours As Needed (abdominal pain and diarrhea). 120 tablet 1 More than a month    ondansetron ODT (ZOFRAN-ODT) 4 MG disintegrating tablet Place 1 tablet on the tongue Every 6 (Six) Hours As Needed for Nausea or Vomiting. 12 tablet 0 More than a month       Allergies:  Patient has no known allergies.    ROS:    Pertinent items are noted in HPI     Objective     Blood pressure 142/98, pulse 72, temperature 97.8 °F (36.6 °C), temperature source Temporal, resp. rate 18, height 175.3 cm (69\"), weight 101 kg (222 lb 10.6 oz), SpO2 97%.    Physical Exam   Constitutional: Pt is oriented to person, place, and time and well-developed, well-nourished, and in no distress.   Mouth/Throat: Oropharynx is clear and moist.   Neck: Normal range of motion.   Cardiovascular: Normal rate, regular rhythm and normal heart sounds.    Pulmonary/Chest: Effort normal and breath sounds normal.   Abdominal: Soft. " Nontender  Skin: Skin is warm and dry.   Psychiatric: Mood, memory, affect and judgment normal.     Assessment & Plan     Diagnosis:  Epigastric pain, lower abd pain, irregular bowel habits    Anticipated Surgical Procedure:  EGD/colonoscopy    The risks, benefits, and alternatives of this procedure have been discussed with the patient or the responsible party- the patient understands and agrees to proceed.

## 2024-04-12 LAB
CYTO UR: NORMAL
LAB AP CASE REPORT: NORMAL
LAB AP CLINICAL INFORMATION: NORMAL
PATH REPORT.FINAL DX SPEC: NORMAL
PATH REPORT.GROSS SPEC: NORMAL

## 2024-04-15 ENCOUNTER — TELEPHONE (OUTPATIENT)
Dept: GASTROENTEROLOGY | Facility: CLINIC | Age: 64
End: 2024-04-15
Payer: OTHER GOVERNMENT

## 2024-06-18 ENCOUNTER — TELEPHONE (OUTPATIENT)
Dept: GASTROENTEROLOGY | Facility: CLINIC | Age: 64
End: 2024-06-18
Payer: OTHER GOVERNMENT

## 2024-06-18 NOTE — TELEPHONE ENCOUNTER
The formerly [facility] received a fax that requires your attention. The document has been indexed to the patient’s chart for your review.      Reason for sending: DENIAL    Documents Description: REQUEST FOR CONTINUATION OF CARE    Name of Sender: EARL RAMOS [facility] OFFICE OF [facility]    Date Indexed: 06/11/2024     Notes (if needed):

## 2024-06-19 NOTE — TELEPHONE ENCOUNTER
Contacted patient about receiving the denial from [facility] about continuation of care. Patient stated he was aware that the [facility] is requiring [facility] to see them for the future appointments. Advised that I would be cancelling the follow up appointment with Jenelle Spence scheduled for 09/05/2024

## (undated) DEVICE — CONN JET HYDRA H20 AUXILIARY DISP

## (undated) DEVICE — LINER SURG CANSTR SXN S/RIGD 1500CC

## (undated) DEVICE — BLCK/BITE BLOX WO/DENTL/RIM W/STRAP 54F

## (undated) DEVICE — Device

## (undated) DEVICE — Device: Brand: DEFENDO AIR/WATER/SUCTION AND BIOPSY VALVE

## (undated) DEVICE — SOLIDIFIER LIQLOC PLS 1500CC BT

## (undated) DEVICE — SINGLE-USE BIOPSY FORCEPS: Brand: RADIAL JAW 4

## (undated) DEVICE — SOL IRRG H2O PL/BG 1000ML STRL